# Patient Record
Sex: MALE | Race: WHITE | NOT HISPANIC OR LATINO | Employment: OTHER | URBAN - METROPOLITAN AREA
[De-identification: names, ages, dates, MRNs, and addresses within clinical notes are randomized per-mention and may not be internally consistent; named-entity substitution may affect disease eponyms.]

---

## 2017-06-30 NOTE — PATIENT DISCUSSION
POAG, OU: INTRAOCULAR PRESSURE IS WITHIN ACCEPTABLE LIMITS. PT CURRENTLY NOT USING ANY DROPS DUE TO DIFFICULTY WITH INSTALLATION. GOOD IOP TODAY. DUE TO SEVERE VISION LOSS DO RECOMMEND CONTINUING DROPS.   SWITCH TO CARTEOLOL BID OU

## 2017-10-20 NOTE — PATIENT DISCUSSION
MILD DRY EYES: PRESCRIBED ARTIFICIAL TEARS BID - QID, OU RETURN FOR FOLLOW-UP AS SCHEDULED OR SOONER IF SYMPTOMS WORSEN. ADD GEL DROP AT BEDTIME.

## 2017-10-20 NOTE — PATIENT DISCUSSION
ATROPHIC RETINAL DEGENERATION OU: PRESCRIBE AREDS 2 VITAMINS / AMSLER GRID QD/ UV PROTECTION. SMOKING CESSATION EMPHASIZED. RETURN FOR FOLLOW-UP AS SCHEDULED.

## 2018-02-20 NOTE — PATIENT DISCUSSION
MODERATE DRY EYES: PRESCRIBED DISAPPEARING PRESERVATIVE OR PRESERVATIVE FREE ARTIFICIAL TEARS BID  QID4-6X A DAY, OU AND THE DAILY INTAKE OF OMEGA-3 DHA/EPA FATTY ACIDS TO HELP RELIEVE SYMPTOMS. ADD NIGHTLY LUBRICATING OINTMENT OR GEL. Ryan Osier

## 2018-08-13 NOTE — PATIENT DISCUSSION
MODERATE DRY EYES: CONTINUE DISAPPEARING PRESERVATIVE OR PRESERVATIVE FREE ARTIFICIAL TEARS BID QID4-6X A DAY, OU AND THE DAILY INTAKE OF OMEGA-3 DHA/EPA FATTY ACIDS TO HELP RELIEVE SYMPTOMS. ADD NIGHTLY LUBRICATING OINTMENT OR GEL. Ravinder Sibley

## 2018-12-17 NOTE — PATIENT DISCUSSION
POAG, OU: INTRAOCULAR PRESSURE IS WITHIN ACCEPTABLE LIMITS. PT STATES SHE FEELS LIKE IT HELPS WHEN SHE IS DILATED. TRIAL OFF CARTEOLOL, ADD ATROPINE QHS.

## 2023-04-27 ENCOUNTER — NEW PATIENT (OUTPATIENT)
Dept: URBAN - METROPOLITAN AREA CLINIC 45 | Facility: CLINIC | Age: 33
End: 2023-04-27

## 2023-04-27 DIAGNOSIS — H52.03: ICD-10-CM

## 2023-04-27 DIAGNOSIS — H52.203: ICD-10-CM

## 2023-04-27 PROCEDURE — 92015 DETERMINE REFRACTIVE STATE: CPT

## 2023-04-27 PROCEDURE — 92004 COMPRE OPH EXAM NEW PT 1/>: CPT

## 2023-04-27 ASSESSMENT — VISUAL ACUITY
OU_SC: J1
OU_CC: 20/20
OS_SC: J1
OU_SC: 20/20-1
OS_SC: 20/25-1
OD_SC: J1
OS_CC: 20/20
OD_CC: 20/20
OD_SC: 20/20

## 2023-04-27 ASSESSMENT — TONOMETRY
OD_IOP_MMHG: 15
OS_IOP_MMHG: 15

## 2023-10-08 NOTE — PATIENT DISCUSSION
SAFETY CHECKLIST  ID Bands and Risk clasps correct and in place (DNR, Fall risk, Allergy, Latex, Limb):  Yes  All Lines Reconciled and labeled correctly: Yes  Whiteboard updated:Yes  Environmental interventions: Yes    Ibeth Duncan RN on 10/8/2023 at 7:28 AM    Continue: carteolol (carteolol): drops: 1% 1 drop twice a day into both eyes 06-

## 2025-01-08 ENCOUNTER — COMPREHENSIVE EXAM (OUTPATIENT)
Age: 35
End: 2025-01-08

## 2025-01-08 DIAGNOSIS — H52.7: ICD-10-CM

## 2025-01-08 PROCEDURE — 92014 COMPRE OPH EXAM EST PT 1/>: CPT

## 2025-01-08 PROCEDURE — 92015 DETERMINE REFRACTIVE STATE: CPT
